# Patient Record
Sex: FEMALE
[De-identification: names, ages, dates, MRNs, and addresses within clinical notes are randomized per-mention and may not be internally consistent; named-entity substitution may affect disease eponyms.]

---

## 2022-02-10 ENCOUNTER — APPOINTMENT (OUTPATIENT)
Dept: UROLOGY | Facility: CLINIC | Age: 27
End: 2022-02-10
Payer: COMMERCIAL

## 2022-02-10 VITALS
DIASTOLIC BLOOD PRESSURE: 75 MMHG | SYSTOLIC BLOOD PRESSURE: 112 MMHG | WEIGHT: 130 LBS | TEMPERATURE: 98.6 F | HEART RATE: 93 BPM | OXYGEN SATURATION: 99 % | HEIGHT: 66 IN | BODY MASS INDEX: 20.89 KG/M2

## 2022-02-10 DIAGNOSIS — M62.89 OTHER SPECIFIED DISORDERS OF MUSCLE: ICD-10-CM

## 2022-02-10 DIAGNOSIS — Z86.59 PERSONAL HISTORY OF OTHER MENTAL AND BEHAVIORAL DISORDERS: ICD-10-CM

## 2022-02-10 DIAGNOSIS — Z78.9 OTHER SPECIFIED HEALTH STATUS: ICD-10-CM

## 2022-02-10 DIAGNOSIS — R10.2 PELVIC AND PERINEAL PAIN: ICD-10-CM

## 2022-02-10 DIAGNOSIS — F90.1 ATTENTION-DEFICIT HYPERACTIVITY DISORDER, PREDOMINANTLY HYPERACTIVE TYPE: ICD-10-CM

## 2022-02-10 PROBLEM — Z00.00 ENCOUNTER FOR PREVENTIVE HEALTH EXAMINATION: Status: ACTIVE | Noted: 2022-02-10

## 2022-02-10 PROCEDURE — 99204 OFFICE O/P NEW MOD 45 MIN: CPT

## 2022-02-10 RX ORDER — LISDEXAMFETAMINE DIMESYLATE 10 MG/1
CAPSULE ORAL
Refills: 0 | Status: ACTIVE | COMMUNITY

## 2022-02-10 NOTE — PHYSICAL EXAM
[General Appearance - Well Developed] : well developed [General Appearance - Well Nourished] : well nourished [Normal Appearance] : normal appearance [Well Groomed] : well groomed [General Appearance - In No Acute Distress] : no acute distress [Edema] : no peripheral edema [Respiration, Rhythm And Depth] : normal respiratory rhythm and effort [Exaggerated Use Of Accessory Muscles For Inspiration] : no accessory muscle use [Normal Station and Gait] : the gait and station were normal for the patient's age [] : no rash [Oriented To Time, Place, And Person] : oriented to person, place, and time [Affect] : the affect was normal [Mood] : the mood was normal [Not Anxious] : not anxious [External Female Genitalia] : normal external genitalia [FreeTextEntry1] : - Prolapse. - CST. Hypertonus levators. No pain or tenderness on palpation. Patient reports lower back irritation s/p pelvic examination.

## 2022-02-10 NOTE — HISTORY OF PRESENT ILLNESS
[FreeTextEntry1] : 27 year-old female presents to the office with lower back pain for the past 2.5 years. Patient was seen by Dr. Mercado (GI) 2-3 weeks ago and was referred here. Patient reports hypertonic pelvic muscles and lower abdominal suprapubic pain.  Patient denies dyspareunia and reports that she has difficulty urinating after relations for at least 30 mins. She has been doing PFPT for 8 months with little improvement. Patient states that these symptoms started when she was running a half marathon and she had an episode of urinary incontinence that came out of nowhere. \par Patient reports trying Valium suppositories and injections into the pelvic floor with no relief of symptoms. \par \par PVR 11 ml\par \par Force of stream: normal \par Hesitancy: no\par intermittency: sometimes\par Dribbling: at the end\par Daytime frequency: 5-6 x \par Nighttime frequency: 1x\par Dysuria: no\par Urgency: yes\par UUI: no\par TOVA: yes (has been better)\par Pad usage: no\par Straining to void: yes\par Incomplete bladder emptying: yes\par UTI: in the past (haven't had one for a few years)\par Hematuria: no\par Stone disease: no\par STD: no\par Bowel Issue: Doesn't feel like she fully empties out her bowels, takes miralax\par Fluid intake: water 2- 24 oz bottles; coffee- sometimes\par Pregnancies: G0 PO\par Prolapse sensation: no\par PFPT: 8 months, notices some improvement but still really hard to relax. \par \par

## 2022-02-10 NOTE — ASSESSMENT
[FreeTextEntry1] : \par Impression/plan: 27 year-old female with pelvic floor dysfunction. \par \par PVR 11 ml\par \par 1. Referral to Dr. Grullon for possible pelvic floor Botox injections. \par 2. Continue PFPT. \par

## 2023-02-06 ENCOUNTER — TRANSCRIPTION ENCOUNTER (OUTPATIENT)
Age: 28
End: 2023-02-06